# Patient Record
Sex: FEMALE | Race: WHITE | NOT HISPANIC OR LATINO | Employment: UNEMPLOYED | URBAN - METROPOLITAN AREA
[De-identification: names, ages, dates, MRNs, and addresses within clinical notes are randomized per-mention and may not be internally consistent; named-entity substitution may affect disease eponyms.]

---

## 2023-09-07 ENCOUNTER — OFFICE VISIT (OUTPATIENT)
Dept: PEDIATRICS | Facility: CLINIC | Age: 18
End: 2023-09-07
Payer: COMMERCIAL

## 2023-09-07 VITALS
BODY MASS INDEX: 22.34 KG/M2 | HEART RATE: 74 BPM | HEIGHT: 66 IN | SYSTOLIC BLOOD PRESSURE: 116 MMHG | DIASTOLIC BLOOD PRESSURE: 70 MMHG | WEIGHT: 139 LBS

## 2023-09-07 DIAGNOSIS — Z00.00 WELLNESS EXAMINATION: Primary | ICD-10-CM

## 2023-09-07 DIAGNOSIS — R07.9 CHEST PAIN, UNSPECIFIED TYPE: ICD-10-CM

## 2023-09-07 PROBLEM — D22.39 MELANOCYTIC NEVI OF OTHER PARTS OF FACE: Status: ACTIVE | Noted: 2021-07-27

## 2023-09-07 PROBLEM — D18.01 HEMANGIOMA OF SKIN AND SUBCUTANEOUS TISSUE: Status: ACTIVE | Noted: 2021-07-27

## 2023-09-07 PROBLEM — D22.4 MELANOCYTIC NEVI OF SCALP AND NECK: Status: ACTIVE | Noted: 2021-07-27

## 2023-09-07 PROBLEM — E11.9 DIABETES (MULTI): Status: ACTIVE | Noted: 2023-09-07

## 2023-09-07 PROBLEM — D22.5 MELANOCYTIC NEVI OF TRUNK: Status: ACTIVE | Noted: 2021-07-27

## 2023-09-07 PROCEDURE — 1036F TOBACCO NON-USER: CPT | Performed by: PEDIATRICS

## 2023-09-07 PROCEDURE — 3078F DIAST BP <80 MM HG: CPT | Performed by: PEDIATRICS

## 2023-09-07 PROCEDURE — 99395 PREV VISIT EST AGE 18-39: CPT | Performed by: PEDIATRICS

## 2023-09-07 PROCEDURE — 3008F BODY MASS INDEX DOCD: CPT | Performed by: PEDIATRICS

## 2023-09-07 PROCEDURE — 3074F SYST BP LT 130 MM HG: CPT | Performed by: PEDIATRICS

## 2023-09-07 RX ORDER — BLOOD SUGAR DIAGNOSTIC
STRIP MISCELLANEOUS
COMMUNITY
Start: 2022-11-18

## 2023-09-07 NOTE — PROGRESS NOTES
"Concerns:   endocrine visit  last week    numbers stable        Some chest pain  not workout related   Not waking ion night July was almost daily   then spread out   Not noticing change in heart rate    Sleep: well rested and waking up well in the morning   Diet:  Varied diet    water drinking  [place]:  soft and regular  Dental:  brushing twice a day and seeing dentist  School:   neuroscience   nutrition      maybe masters  arie   BW   Activities:  workout  running     half    Drugs/Alcohol/Tobacco/Vaping: discussed   Sexuality/Puberty: discussed   dating   BF    iud   x 2years  Depression/Moods:     Exam:       height is 1.664 m (5' 5.5\") and weight is 63 kg (139 lb). Her blood pressure is 116/70 and her pulse is 74.     General: Well-developed, well-nourished, alert and oriented, no acute distress  Eyes: Normal sclera, JEYSON, EOMI. Red reflex intact, light reflex symmetric.   ENT: Moist mucous membranes, normal throat, no nasal discharge. TMs are normal.  Cardiac:  Normal S1/S2, regular rhythm. Capillary refill less than 2 seconds. No clinically significant murmurs.    Pulmonary: Clear to auscultation bilaterally, no work of breathing.  GI: Soft nontender nondistended abdomen, no HSM, no masses.    Skin: No specific or unusual rashes  Neuro: Symmetric face, no ataxia, grossly normal strength.  Lymph and Neck: No lymphadenopathy, no visible thyroid swelling.  Orthopedic:  normal range of motion of shoulders and normal duck walk, normal spine/no scoliosis    Chaperone Present:   :      Assessment and Plan:            We discussed physical activity and nutritional requirements today. Continue to return annually for a checkup and any necessary booster vaccines.   You are responsible for  your own health decisions.  Please make sure you have access to your medical information via the [email or web address] [facility]    Type B meningitis vaccine has been available since 2015. Type B meningitis now accounts for 30% of all meningitis cases " because the original Type ACWY meningitis vaccine has worked so well. On average there are 1-2 college campuses affected per year with some cases.  We recommend this vaccine to prevent meningitis in late high school and college age children.     Tetanus booster (usually Tdap) should be given 10 years after the last one, typically around age 22 yrs.       Eye exam    Follow up with endocrine  gyn

## 2023-09-28 DIAGNOSIS — R07.9 CHEST PAIN, UNSPECIFIED TYPE: ICD-10-CM

## 2023-10-06 ENCOUNTER — HOSPITAL ENCOUNTER (OUTPATIENT)
Dept: CARDIOLOGY | Facility: CLINIC | Age: 18
Discharge: HOME | End: 2023-10-06
Payer: COMMERCIAL

## 2023-10-06 VITALS
HEIGHT: 65 IN | SYSTOLIC BLOOD PRESSURE: 116 MMHG | WEIGHT: 139 LBS | BODY MASS INDEX: 23.16 KG/M2 | DIASTOLIC BLOOD PRESSURE: 70 MMHG

## 2023-10-06 DIAGNOSIS — R07.9 CHEST PAIN, UNSPECIFIED TYPE: ICD-10-CM

## 2023-10-06 DIAGNOSIS — R07.89 OTHER CHEST PAIN: ICD-10-CM

## 2023-10-06 LAB — EJECTION FRACTION APICAL 4 CHAMBER: 50.6

## 2023-10-06 PROCEDURE — 93306 TTE W/DOPPLER COMPLETE: CPT

## 2023-10-06 PROCEDURE — 93306 TTE W/DOPPLER COMPLETE: CPT | Performed by: INTERNAL MEDICINE

## 2023-10-26 PROBLEM — M25.552 HIP PAIN, BILATERAL: Status: ACTIVE | Noted: 2023-10-26

## 2023-10-26 PROBLEM — B09 VIRAL EXANTHEM: Status: ACTIVE | Noted: 2023-10-26

## 2023-10-26 PROBLEM — N94.6 DYSMENORRHEA: Status: ACTIVE | Noted: 2023-10-26

## 2023-10-26 PROBLEM — R07.9 CHEST PAIN: Status: ACTIVE | Noted: 2023-10-26

## 2023-10-26 PROBLEM — E10.9 TYPE 1 DIABETES (MULTI): Status: ACTIVE | Noted: 2023-10-26

## 2023-10-26 PROBLEM — M25.551 HIP PAIN, BILATERAL: Status: ACTIVE | Noted: 2023-10-26

## 2023-10-26 PROBLEM — L70.9 ACNE: Status: ACTIVE | Noted: 2023-10-26

## 2023-10-26 RX ORDER — LEVONORGESTREL 52 MG/1
52 INTRAUTERINE DEVICE INTRAUTERINE
COMMUNITY
Start: 2021-08-04

## 2023-10-26 RX ORDER — TRETINOIN 0.5 MG/G
CREAM TOPICAL
COMMUNITY
Start: 2023-07-31

## 2023-10-26 RX ORDER — INSULIN LISPRO 100 [IU]/ML
INJECTION, SOLUTION INTRAVENOUS; SUBCUTANEOUS
COMMUNITY
Start: 2023-09-07

## 2023-10-26 RX ORDER — TRETINOIN 0.25 MG/G
CREAM TOPICAL
COMMUNITY
Start: 2023-04-12

## 2023-10-26 RX ORDER — LANCETS
EACH MISCELLANEOUS
COMMUNITY
Start: 2023-02-13

## 2023-10-26 RX ORDER — PEN NEEDLE, DIABETIC 32GX 5/32"
NEEDLE, DISPOSABLE MISCELLANEOUS
COMMUNITY
Start: 2023-07-03

## 2023-10-26 RX ORDER — CLINDAMYCIN PHOSPHATE 10 UG/ML
LOTION TOPICAL
COMMUNITY
Start: 2023-08-01

## 2023-10-26 RX ORDER — INSULIN GLARGINE 100 [IU]/ML
INJECTION, SOLUTION SUBCUTANEOUS
COMMUNITY
Start: 2022-12-22

## 2023-10-26 RX ORDER — BLOOD-GLUCOSE TRANSMITTER
EACH MISCELLANEOUS
COMMUNITY
Start: 2023-09-13

## 2023-10-26 RX ORDER — IBUPROFEN 800 MG/1
1 TABLET ORAL 3 TIMES DAILY
COMMUNITY
Start: 2023-09-28

## 2023-10-26 RX ORDER — BLOOD-GLUCOSE SENSOR
EACH MISCELLANEOUS
COMMUNITY
Start: 2023-09-12

## 2023-10-27 ENCOUNTER — TELEMEDICINE (OUTPATIENT)
Dept: CARDIOLOGY | Facility: CLINIC | Age: 18
End: 2023-10-27
Payer: COMMERCIAL

## 2023-10-27 DIAGNOSIS — R07.89 OTHER CHEST PAIN: Primary | ICD-10-CM

## 2023-10-27 DIAGNOSIS — E10.59 TYPE 1 DIABETES MELLITUS WITH OTHER CIRCULATORY COMPLICATION (MULTI): ICD-10-CM

## 2023-10-27 PROCEDURE — 99212 OFFICE O/P EST SF 10 MIN: CPT | Mod: 95 | Performed by: NURSE PRACTITIONER

## 2023-10-27 PROCEDURE — 99212 OFFICE O/P EST SF 10 MIN: CPT | Performed by: NURSE PRACTITIONER

## 2023-10-27 NOTE — PROGRESS NOTES
"Karla De is a 18 y.o. female being seen virtually for complaints of chest pain     History Of Present Illness   Miss De is a type I diabetic (on an insulin pump), here for a virtual follow up for complaints of chest pains that started in July and are becoming more intense and lasting longer. The patient states that at the end of July, she developed sharp chest pains that last several minutes. These pains are more pronounced at night and occur both with rest and with exertion. She states that one episode of chest pain lasted over an hour and became \"more achy than sharp.\" Nothing relieves the pain. She denies any complaints of fever, chills, shortness of breath, palpitations, lower extremity edema, dizziness or syncope. She states that the episodes of chest pain are not affected by food or stress.       At her prior visit, I started her on ibuprofen 800 mg TID for 3 days, Today, she continues to have these episodes of chest pains    Social HX  She is a student at  majoring in Neuroscience.  She does not smoke  She exercises daily and consumes a very healthy diabetic diet.          Family HX  No family history on file.       Review Of Systems     Constitutional: not feeling tired.   Eyes: no eyesight problems.   ENT: no hearing loss and no nosebleeds.   Cardiovascular: +episodes of chest pain and pressure  Respiratory: no chronic cough and no shortness of breath.   Gastrointestinal: no change in bowel habits and no blood in stools.   Genitourinary: no urinary frequency.   Skin: no skin rashes.   Neurological: no seizures and no frequent falls.   Psychiatric: no depression and not suicidal.   All other systems have been reviewed and are negative for complaint.      Allergies  No Known Allergies       Vitals  There were no vitals taken for this visit.        Physical Exam  Virtual visit      Current/Home Meds    Current Outpatient Medications:     Accu-Chek Guide test strips strip, USE AS DIRECTED 5-7 TIMES A " "DAY, Disp: , Rfl:     Accu-Chek Softclix Lancets misc, , Disp: , Rfl:     BD Cora 2nd Gen Pen Needle 32 gauge x 5/32\" needle, INJECT 4 TIMES DAILY AS DIRECTED, Disp: , Rfl:     clindamycin (Cleocin T) 1 % lotion, Refill(s) 0, Date: 08/01/2023 08:10:00 EDT, Disp: , Rfl:     Dexcom G6 Sensor device, , Disp: , Rfl:     Dexcom G6 Transmitter device, , Disp: , Rfl:     ibuprofen 800 mg tablet, Take 1 tablet (800 mg) by mouth 3 times a day., Disp: , Rfl:     insulin glargine (Lantus) 100 unit/mL (3 mL) pen, , Disp: , Rfl:     insulin lispro (HumaLOG) 100 unit/mL injection, INJECT UP TO 30 UNITS DAILY VIA PUMP AS DIRECTED, Disp: , Rfl:     levonorgestrel (Mirena) 21 mcg/24 hours (8 yrs) 52 mg IUD, 52 mg., Disp: , Rfl:     tretinoin (Retin-A) 0.025 % cream, APPLY 1 APPLICATION TOPICALLY TO AFFECTED AREA DAILY IN THE EVENING TO FACE, Disp: , Rfl:     tretinoin (Retin-A) 0.05 % cream, APPLY PEA SIZED AMOUNT TOPICALLY TO FACE EVERY OTHER NIGHT, Disp: , Rfl:        Labs           EKG Findings  EKG:         Cardiac Service Results:  10/6/2022 ECHO  1. Left ventricular systolic function is normal with a 55-60% estimated ejection fraction.   2. RVSP within normal limits.    Assessment/Plan      CHEST PAIN:  She did not have her labs completed as ordered (sed rate and CRP).  Echo shows no evidence of pericarditis, valvular issues or pericardial effusions.  She states the ibuprofen did not improve her complaints of chest pain.  I would like her to proceed with a cardiac stress test, however she refuses at this time.  I have encouraged her to go directly to ED for any further episodes of chest pain.  She will call me if she is agreeable to proceed with a cardiac stress test.     "

## 2024-02-08 ENCOUNTER — OFFICE VISIT (OUTPATIENT)
Dept: PEDIATRICS | Facility: CLINIC | Age: 19
End: 2024-02-08
Payer: COMMERCIAL

## 2024-02-08 VITALS
HEART RATE: 72 BPM | BODY MASS INDEX: 23.91 KG/M2 | DIASTOLIC BLOOD PRESSURE: 72 MMHG | WEIGHT: 143.7 LBS | SYSTOLIC BLOOD PRESSURE: 111 MMHG

## 2024-02-08 DIAGNOSIS — F41.9 ANXIETY: Primary | ICD-10-CM

## 2024-02-08 DIAGNOSIS — F32.A DEPRESSION, UNSPECIFIED DEPRESSION TYPE: ICD-10-CM

## 2024-02-08 PROBLEM — M70.50 PES ANSERINE BURSITIS: Status: ACTIVE | Noted: 2024-01-04

## 2024-02-08 PROCEDURE — 99213 OFFICE O/P EST LOW 20 MIN: CPT | Performed by: PEDIATRICS

## 2024-02-08 PROCEDURE — 3008F BODY MASS INDEX DOCD: CPT | Performed by: PEDIATRICS

## 2024-02-08 PROCEDURE — 1036F TOBACCO NON-USER: CPT | Performed by: PEDIATRICS

## 2024-02-08 PROCEDURE — 3074F SYST BP LT 130 MM HG: CPT | Performed by: PEDIATRICS

## 2024-02-08 PROCEDURE — 3078F DIAST BP <80 MM HG: CPT | Performed by: PEDIATRICS

## 2024-02-08 RX ORDER — MELOXICAM 15 MG/1
15 TABLET ORAL
COMMUNITY
Start: 2023-11-22

## 2024-02-08 RX ORDER — FLUOXETINE 10 MG/1
TABLET ORAL
Qty: 42 TABLET | Refills: 0 | Status: SHIPPED | OUTPATIENT
Start: 2024-02-08 | End: 2024-03-05

## 2024-02-08 NOTE — PATIENT INSTRUCTIONS
You were here today for anxiety/depression. We discussed possible treatment options including counseling (personal and family) alone, medication alone, and combination treatment. We discussed starting medicine and that SSRIs are typically first line medication in children/adolescents. Will start on prozac. Discussed how to take, potential side effects, box warning, etc. Discussed that these medications typically take 4-6 weeks to show signs of improvement. Discussed how important it is to never stop medication without consulting with a physician first. Discussed expected course of treatment. After two weeks on the initial dose, you can double the dose you feel the need.  Please call me with an update in a few weeks   Please call sooner with any concerns. Discussed typical follow up schedule when on these medications.         Travel clinic for upcoming [facility] trip

## 2024-02-08 NOTE — PROGRESS NOTES
Karla De is a 18 y.o. female who presents for Anxiety (Patient here today for anxiety consult).      HPI    Sees  Yumiko   thru [employer]      Once every  week  or every  other  for a long time      Good relationship   with counselor     Felt like   more seasonal  depression  this year   most is anxiety       Good relationship    with Bf       senior      Therapist  recommending med as well      No thought of SH     Feels like has tried without meds for a long time                     Objective   /72 (BP Location: Left arm, Patient Position: Sitting, BP Cuff Size: Adult)   Pulse 72   Wt 65.2 kg (143 lb 11.2 oz)   BMI 23.91 kg/m²       Physical Exam  General: Well-developed, well-nourished, alert and oriented, no acute distress  Eyes: Normal sclera, JEYSON, EOMI. Red reflex intact, light reflex symmetric.   ENT: Moist mucous membranes, normal throat, no nasal discharge. TMs are normal.  Cardiac:  Normal S1/S2, regular rhythm. Capillary refill less than 2 seconds. No clinically significant murmurs.    Pulmonary: Clear to auscultation bilaterally, no work of breathing.  GI: Soft nontender nondistended abdomen, no HSM, no masses.    Skin: No specific or unusual rashes  Neuro: Symmetric face, no ataxia, grossly normal strength and normal reflexes.  Lymph and Neck: No lymphadenopathy, no visible thyroid swelling.  Musculoskeletal:   Full  range of motion, normal strength and tone, no significant scoliosis,  no joint swelling or bone tenderness  Psych:  normal mood and affect          Assessment/Plan   Problem List Items Addressed This Visit    None  Visit Diagnoses       Anxiety    -  Primary    Relevant Medications    FLUoxetine (PROzac) 10 mg tablet    Depression, unspecified depression type        Relevant Medications    FLUoxetine (PROzac) 10 mg tablet            You were here today for anxiety/depression. We discussed possible treatment options including counseling (personal and family) alone,  medication alone, and combination treatment. We discussed starting medicine and that SSRIs are typically first line medication in children/adolescents. Will start on prozac. Discussed how to take, potential side effects, box warning, etc. Discussed that these medications typically take 4-6 weeks to show signs of improvement. Discussed how important it is to never stop medication without consulting with a physician first. Discussed expected course of treatment. After two weeks on the initial dose, you can double the dose you feel the need.  Please call me with an update in a few weeks   Please call sooner with any concerns. Discussed typical follow up schedule when on these medications.         Travel clinic for upcoming [facility] trip

## 2024-03-05 DIAGNOSIS — F32.A DEPRESSION, UNSPECIFIED DEPRESSION TYPE: ICD-10-CM

## 2024-03-05 DIAGNOSIS — F41.9 ANXIETY: Primary | ICD-10-CM

## 2024-03-05 RX ORDER — FLUOXETINE HYDROCHLORIDE 20 MG/1
20 CAPSULE ORAL DAILY
Qty: 30 CAPSULE | Refills: 2 | Status: SHIPPED | OUTPATIENT
Start: 2024-03-05 | End: 2024-04-01

## 2024-03-20 ENCOUNTER — APPOINTMENT (OUTPATIENT)
Dept: SURGERY | Facility: CLINIC | Age: 19
End: 2024-03-20
Payer: COMMERCIAL

## 2024-03-26 DIAGNOSIS — Z78.9 PATIENT TRAVELS: ICD-10-CM

## 2024-03-26 DIAGNOSIS — Z71.84 TRAVEL ADVICE ENCOUNTER: Primary | ICD-10-CM

## 2024-07-26 LAB
NON-UH HIE A/G RATIO: 1.4
NON-UH HIE ALB: 4.1 G/DL (ref 3.4–5)
NON-UH HIE ALK PHOS: 46 UNIT/L (ref 45–117)
NON-UH HIE BILIRUBIN, TOTAL: 0.5 MG/DL (ref 0.3–1.2)
NON-UH HIE BUN/CREAT RATIO: 16.7
NON-UH HIE BUN: 15 MG/DL (ref 9–23)
NON-UH HIE CALCIUM: 9.4 MG/DL (ref 8.7–10.4)
NON-UH HIE CALCULATED LDL CHOLESTEROL: 81 MG/DL (ref 60–130)
NON-UH HIE CALCULATED OSMOLALITY: 281 MOSM/KG (ref 275–295)
NON-UH HIE CHLORIDE: 107 MMOL/L (ref 98–107)
NON-UH HIE CHOLESTEROL: 157 MG/DL (ref 100–200)
NON-UH HIE CO2, VENOUS: 28 MMOL/L (ref 20–31)
NON-UH HIE CREATININE, URINE MG/DL: 47.8 MG/DL
NON-UH HIE CREATININE: 0.9 MG/DL (ref 0.5–0.8)
NON-UH HIE DIRECT LDL: 91 MG/DL
NON-UH HIE GFR AA: >60
NON-UH HIE GLOBULIN: 3 G/DL
NON-UH HIE GLOMERULAR FILTRATION RATE: >60 ML/MIN/1.73M?
NON-UH HIE GLUCOSE: 140 MG/DL (ref 74–106)
NON-UH HIE GOT: 19 UNIT/L (ref 15–37)
NON-UH HIE GPT: 21 UNIT/L (ref 10–49)
NON-UH HIE HDL CHOLESTEROL: 66 MG/DL (ref 40–60)
NON-UH HIE HGB A1C: 5.5 %
NON-UH HIE K: 4.2 MMOL/L (ref 3.5–5.1)
NON-UH HIE MICROALBUMIN, URINE MG/L: <3 MG/L
NON-UH HIE MICROALBUMIN/CREATININE RATIO: <6 MG MALB/GM CREAT (ref 0–30)
NON-UH HIE NA: 139 MMOL/L (ref 135–145)
NON-UH HIE TOTAL CHOL/HDL CHOL RATIO: 2.4
NON-UH HIE TOTAL PROTEIN: 7.1 G/DL (ref 5.7–8.2)
NON-UH HIE TRIGLYCERIDES: 50 MG/DL (ref 30–150)
NON-UH HIE VIT D 25: 53 NG/ML

## 2024-12-18 ENCOUNTER — APPOINTMENT (OUTPATIENT)
Dept: PEDIATRICS | Facility: CLINIC | Age: 19
End: 2024-12-18
Payer: COMMERCIAL

## 2024-12-18 VITALS
DIASTOLIC BLOOD PRESSURE: 78 MMHG | HEART RATE: 75 BPM | WEIGHT: 146.4 LBS | HEIGHT: 66 IN | BODY MASS INDEX: 23.53 KG/M2 | SYSTOLIC BLOOD PRESSURE: 114 MMHG

## 2024-12-18 DIAGNOSIS — F41.9 ANXIETY: ICD-10-CM

## 2024-12-18 DIAGNOSIS — F32.A DEPRESSION, UNSPECIFIED DEPRESSION TYPE: ICD-10-CM

## 2024-12-18 DIAGNOSIS — Z00.00 WELLNESS EXAMINATION: Primary | ICD-10-CM

## 2024-12-18 PROCEDURE — 90715 TDAP VACCINE 7 YRS/> IM: CPT | Performed by: PEDIATRICS

## 2024-12-18 PROCEDURE — 3074F SYST BP LT 130 MM HG: CPT | Performed by: PEDIATRICS

## 2024-12-18 PROCEDURE — 3008F BODY MASS INDEX DOCD: CPT | Performed by: PEDIATRICS

## 2024-12-18 PROCEDURE — 3078F DIAST BP <80 MM HG: CPT | Performed by: PEDIATRICS

## 2024-12-18 PROCEDURE — 1036F TOBACCO NON-USER: CPT | Performed by: PEDIATRICS

## 2024-12-18 PROCEDURE — 90471 IMMUNIZATION ADMIN: CPT | Performed by: PEDIATRICS

## 2024-12-18 PROCEDURE — 99395 PREV VISIT EST AGE 18-39: CPT | Performed by: PEDIATRICS

## 2024-12-18 RX ORDER — FLUOXETINE 10 MG/1
10 CAPSULE ORAL DAILY
Qty: 90 CAPSULE | Refills: 3 | Status: SHIPPED | OUTPATIENT
Start: 2024-12-18 | End: 2025-12-13

## 2024-12-18 RX ORDER — FLUOXETINE HYDROCHLORIDE 20 MG/1
20 CAPSULE ORAL DAILY
Qty: 90 CAPSULE | Refills: 3 | Status: SHIPPED | OUTPATIENT
Start: 2024-12-18 | End: 2025-12-18

## 2024-12-18 NOTE — PROGRESS NOTES
"Well Child (Pt by herself for 19 yr [facility])      Concerns:   just saw   endocrine           numbers all great  no changes       Sleep: well rested and waking up well in the morning   Diet:  Varied diet   [place]:  soft and regular  IUD   sees  Jak   Dental:  brushing twice a day and seeing dentist  School:        neuroscience  senior    Activities:     gym  running  Drugs/Alcohol/Tobacco/Vaping: discussed   Sexuality/Puberty: discussed   Depression/Moods:    Recent  breakup with BF which has been really rough  sees counselor weekly same one for 5 years   wants to try increase to 30 on prozac  Exam:       height is 1.67 m (5' 5.75\") and weight is 66.4 kg (146 lb 6.4 oz). Her blood pressure is 114/78 and her pulse is 75.     General: Well-developed, well-nourished, alert and oriented, no acute distress  Eyes: Normal sclera, JEYSON, EOMI. Red reflex intact, light reflex symmetric.   ENT: Moist mucous membranes, normal throat, no nasal discharge. TMs are normal.  Cardiac:  Normal S1/S2, regular rhythm. Capillary refill less than 2 seconds. No clinically significant murmurs.    Pulmonary: Clear to auscultation bilaterally, no work of breathing.  GI: Soft nontender nondistended abdomen, no HSM, no masses.    Skin: No specific or unusual rashes  Neuro: Symmetric face, no ataxia, grossly normal strength.  Lymph and Neck: No lymphadenopathy, no visible thyroid swelling.  Orthopedic:  normal range of motion of shoulders and normal duck walk, normal spine/no scoliosis    :   not examined   Assessment and Plan:    Diagnoses and all orders for this visit:  Wellness examination  Pediatric body mass index (BMI) of 5th percentile to less than 85th percentile for age  Anxiety  -     FLUoxetine (PROzac) 20 mg capsule; Take 1 capsule (20 mg) by mouth once daily.  -     FLUoxetine (PROzac) 10 mg capsule; Take 1 capsule (10 mg) by mouth once daily.  Depression, unspecified depression type  -     FLUoxetine (PROzac) 20 mg capsule; Take 1 " capsule (20 mg) by mouth once daily.  -     FLUoxetine (PROzac) 10 mg capsule; Take 1 capsule (10 mg) by mouth once daily.  Other orders  -     Tdap vaccine, age 7 years and older  (BOOSTRIX)          We discussed physical activity and nutritional requirements today. Continue to return annually for a checkup and any necessary booster vaccines.   You are responsible for  your own health decisions.  Please make sure you have access to your medical information via the [employer] [facility]    Type B meningitis vaccine has been available since 2015. Type B meningitis now accounts for 30% of all meningitis cases because the original Type ACWY meningitis vaccine has worked so well. On average there are 1-2 college campuses affected per year with some cases.  We recommend this vaccine to prevent meningitis in late high school and college age children.     Tetanus booster (usually Tdap) should be given 10 years after the last one, typically around age 22 yrs.         Tdap

## 2024-12-18 NOTE — PATIENT INSTRUCTIONS
Return annually for a well check    Follow up with endocrine as  scheduled      GYN annually reccommended

## 2025-01-10 DIAGNOSIS — F32.A DEPRESSION, UNSPECIFIED DEPRESSION TYPE: Primary | ICD-10-CM

## 2025-01-10 RX ORDER — FLUOXETINE HYDROCHLORIDE 40 MG/1
40 CAPSULE ORAL DAILY
Qty: 90 CAPSULE | Refills: 3 | Status: SHIPPED | OUTPATIENT
Start: 2025-01-10 | End: 2026-01-10

## 2025-03-21 DIAGNOSIS — F32.A DEPRESSION, UNSPECIFIED DEPRESSION TYPE: ICD-10-CM

## 2025-03-21 DIAGNOSIS — F41.9 ANXIETY: ICD-10-CM

## 2025-03-21 RX ORDER — FLUOXETINE HYDROCHLORIDE 20 MG/1
20 CAPSULE ORAL DAILY
Qty: 90 CAPSULE | Refills: 3 | Status: SHIPPED | OUTPATIENT
Start: 2025-03-21 | End: 2026-03-21

## 2025-04-01 LAB
NON-UH HIE A/G RATIO: 1.1
NON-UH HIE ALB: 3.8 G/DL (ref 3.4–5)
NON-UH HIE ALK PHOS: 42 UNIT/L (ref 45–117)
NON-UH HIE BILIRUBIN, TOTAL: 0.8 MG/DL (ref 0.3–1.2)
NON-UH HIE BUN/CREAT RATIO: 18.8
NON-UH HIE BUN: 15 MG/DL (ref 9–23)
NON-UH HIE CALCIUM: 9.6 MG/DL (ref 8.7–10.4)
NON-UH HIE CALCULATED OSMOLALITY: 278 MOSM/KG (ref 275–295)
NON-UH HIE CHLORIDE: 103 MMOL/L (ref 98–107)
NON-UH HIE CO2, VENOUS: 26 MMOL/L (ref 20–31)
NON-UH HIE CREATININE: 0.8 MG/DL (ref 0.5–0.8)
NON-UH HIE GFR AA: >60
NON-UH HIE GLOBULIN: 3.4 G/DL
NON-UH HIE GLOMERULAR FILTRATION RATE: >60 ML/MIN/1.73M?
NON-UH HIE GLUCOSE: 116 MG/DL (ref 74–106)
NON-UH HIE GOT: 15 UNIT/L (ref 15–37)
NON-UH HIE GPT: 12 UNIT/L (ref 10–49)
NON-UH HIE HGB A1C: 6 %
NON-UH HIE K: 4.2 MMOL/L (ref 3.5–5.1)
NON-UH HIE NA: 138 MMOL/L (ref 135–145)
NON-UH HIE TOTAL PROTEIN: 7.2 G/DL (ref 5.7–8.2)

## 2025-12-18 ENCOUNTER — APPOINTMENT (OUTPATIENT)
Dept: PEDIATRICS | Facility: CLINIC | Age: 20
End: 2025-12-18
Payer: COMMERCIAL